# Patient Record
(demographics unavailable — no encounter records)

---

## 2024-12-06 NOTE — PHYSICAL EXAM
[Well Developed] : well developed [Normal Venous Pressure] : normal venous pressure [No Carotid Bruit] : no carotid bruit [Normal S1, S2] : normal S1, S2 [Murmur] : murmur [Soft] : abdomen soft [Non Tender] : non-tender [No Focal Deficits] : no focal deficits [de-identified] : marked right leg edema, mild-moderate left leg edema [de-identified] : moderate edema

## 2024-12-06 NOTE — ASSESSMENT
[FreeTextEntry1] : ---------------------------------------------------------------------- ECG - significant artifact. Atrial fibrillation. avg rate 58-90. low voltage  Same meds Low sodium diet Exercise RTO 3 mos

## 2025-01-02 NOTE — HISTORY OF PRESENT ILLNESS
[FreeTextEntry1] : Patient went to [facility] ER with SOB. Transferred to [facility] with CHF. Hospitalized for 8 days. Discharge records brought by patient and reviewed. Since d/c, no SOB, ERNST, chest pain.

## 2025-01-02 NOTE — PHYSICAL EXAM
[Well Developed] : well developed [No Acute Distress] : no acute distress [Normal Venous Pressure] : normal venous pressure [No Carotid Bruit] : no carotid bruit [Normal S1, S2] : normal S1, S2 [Murmur] : murmur [Clear Lung Fields] : clear lung fields [Soft] : abdomen soft [Non Tender] : non-tender [No Focal Deficits] : no focal deficits [de-identified] : mild right leg edema, trace left leg edema [de-identified] : moderate edema

## 2025-01-29 NOTE — END OF VISIT
Progress Note    Patient ID: Mary Jane Henriquez is a 60 year old female  Chief Complaint  Chief Complaint   Patient presents with   • Follow-up   • Imm/Inj     flu      History of Present Illness  Problem List Items Addressed This Visit      Respiratory   COPD (chronic obstructive pulmonary disease) (CMS/[facility])        12/21/2020  Has been doing ok and will see dr chun in February 2021  Continue inhalers          Circulatory   Elevated coronary artery calcium score      Her score was 910 and should be 0-100  Should be on atorvastatin   Recheck lipid profile now that she stopped the medication for the last few months        Essential hypertension - Primary      ==================  12/21/2020  120/60--is on verapamil 240 mg twice daily and losartan 100 mg daily at night          Endocrine   Dyslipidemia, goal LDL below 70      12/21/2020  Went off the atorvastatin that dr monterroso put her on  Need to return for fasting lipids        Relevant Orders   LIPID PANEL WITHOUT REFLEX    Other Visit Diagnoses    Needs flu shot       Relevant Orders   INFLUENZA QUADRIVALENT SPLIT PRES FREE 0.5 ML VACC, IM (FLULAVAL,FLUARIX,FLUZONE) (Completed)          History  Current Outpatient Medications   Medication Sig Dispense Refill   • LORazepam (ATIVAN) 1 MG tablet Take 1 tablet by mouth at bedtime. 30 tablet 5   • verapamil 120 MG tablet Take 2 tablets by mouth 2 times daily. 120 tablet 5   • zoledronic acid (RECLAST) 5 MG/100ML injectable solution      • losartan (COZAAR) 100 MG tablet Take 1 tablet by mouth daily. 90 tablet 3   • divalproex (DEPAKOTE) 250 MG delayed release EC tablet Take 1 tablet by mouth 3 times daily. 90 tablet 5   • meloxicam (MOBIC) 7.5 MG tablet TK 1 T PO  D  0   • montelukast (SINGULAIR) 10 MG tablet Take 10 mg by mouth every morning.     • albuterol 108 (90 Base) MCG/ACT inhaler Inhale 2 puffs into the lungs every 4 hours as needed.     • sertraline (ZOLOFT) 100 MG tablet Take 1 tablet by mouth 2 times daily.  180 tablet 3   • SUMAtriptan (IMITREX STATDOSE) 6 MG/0.5ML auto-injector INJECT 0.5 ML AT ONSET OF HEADACHE, MAY REPEAT IN 2 HOURS. MAX 2 INJECTIONS PER DAY, NO MORE THAN 2 DAYS PER WEEK 3 mL 5   • fluticasone furoate (ARNUITY ELLIPTA) 100 MCG/ACT inhaler Inhale 1 puff into the lungs daily.     • Glycopyrrolate-Formoterol (BEVESPI AEROSPHERE) 9-4.8 MCG/ACT inhaler Inhale 2 puffs into the lungs 2 times daily.     • fluticasone (FLONASE) 50 MCG/ACT nasal spray Spray in each nostril daily.     • Cholecalciferol (VITAMIN D3) 5000 units capsule Take by mouth 3 days a week.      • ALPRAZolam (XANAX) 0.25 MG tablet Take by mouth as needed.      • Multiple Vitamins-Minerals (CENTRUM SILVER) tablet Take by mouth daily.     • traMADol (ULTRAM) 50 MG tablet TK 1 T PO Q 4 TO 6 H PRN P       No current facility-administered medications for this visit.        Allergies  ALLERGIES:   Allergen Reactions   • Budeprion Sr Other (See Comments)   • Bupropion SWELLING   • Cat Dander Other (See Comments)       Immunization History   Administered Date(s) Administered   • Influenza, injectable, quadrivalent, preservative-free 12/19/2017, 12/13/2018, 01/06/2020, 12/21/2020   • Influenza, seasonal, injectable, preservative free 09/18/2017   • Influenza, seasonal, injectable, trivalent 12/20/2016   • Pneumococcal Conjugate 13 valent 03/17/2020   • Pneumococcal polysaccharide, adult, 23 valent 12/13/2018   • TD Adult, Unspecified Formulation 06/21/2009   Deferred Date(s) Deferred   • Tdap 07/28/2020       Screening schedule/checklist for next 5-10 years:  Health Maintenance Due   Topic Date Due   • DTaP/Tdap/Td Vaccine (1 - Tdap) 05/10/1979   • Shingles Vaccine (1 of 2) 05/10/2010   • Hepatitis C Screening  05/10/2011   • Influenza Vaccine (1) 09/01/2020        Family History  Family History   Problem Relation Age of Onset   • Dementia/Alzheimers Mother    • Cancer, Breast Mother    • Arthritis Mother    • Cancer Mother    • Depression Mother   [Time Spent: ___ minutes] : I have spent [unfilled] minutes of time on the encounter which excludes teaching and separately reported services.        Sister also   • Hearing Loss Mother    • Myocardial Infarction Father    • Hypertension Father    • Heart disease Father    • High blood pressure Father    • Hypertension Sister    • Melanoma Sister    • COPD Sister    • Asthma Sister    • Depression Sister    • High blood pressure Sister    • Hypertension Brother    • Myocardial Infarction Brother    • Cirrhosis Brother    • High blood pressure Brother    • Substance abuse Brother    • Patient is unaware of any medical problems Son    • Patient is unaware of any medical problems Son      Social History  Social History     Socioeconomic History   • Marital status:      Spouse name: Not on file   • Number of children: 2   • Years of education: Not on file   • Highest education level: Not on file   Occupational History   • 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    Social Needs   • Financial resource strain: Not on file   • Food insecurity     Worry: Not on file     Inability: Not on file   • Transportation needs     Medical: Not on file     Non-medical: Not on file   Tobacco Use   • Smoking status: Former Smoker     Packs/day: 1.00     Years: 40.00     Pack years: 40.00     Types: Cigarettes     Quit date: 2019     Years since quittin.4   • Smokeless tobacco: Never Used   • Tobacco comment: quit 12 weeks ago   Substance and Sexual Activity   • Alcohol use: Not Currently     Frequency: Never     Binge frequency: Never   • Drug use: Never   • Sexual activity: Not on file   Lifestyle   • Physical activity     Days per week: 0 days     Minutes per session: 0 min   • Stress: Not on file   Relationships   • Social connections     Talks on phone: Not on file     Gets together: Not on file     Attends [employer] service: Not on file     Active member of club or organization: Not on file     Attends meetings of clubs or organizations: Not on file     Relationship status: Not on file   • Intimate partner  violence     Fear of current or ex partner: Not on file     Emotionally abused: Not on file     Physically abused: Not on file     Forced sexual activity: Not on file   Other Topics Concern   • Not on file   Social History Narrative    Broken left lower leg and broken thumb in November 2019 when dog walking --had 3 bad falls--is to start physical therapy --sees orthopedic doctor --Dr Deepak Humphrey     Past Medical History   Past Medical History:   Diagnosis Date   • Agoraphobia    • Anxiety    • Closed nondisplaced fracture of proximal phalanx of left thumb with routine healing 1/6/2020    Happened on one of her falls --is under a hand specialist 's care also --Dr Dean    • Cluster headache    • COPD (chronic obstructive pulmonary disease) (CMS/[facility]) 2018   • Depression    • Emphysema of lung (CMS/[facility]) 2000   • History of kidney stones    • HTN (hypertension)    • Nicotine dependence    • Osteoporosis    • Pre-ulcerative corn or callous 3/12/2018   • RAD (reactive airway disease) 2019     Past Surgical History  Past Surgical History:   Procedure Laterality Date   • Cervical conization   w/ laser     • D and c     • Ectopic pregnancy surgery     • Endometrial ablation     • Extracorporeal shock wave lithotripsy      Dr. Dawson   • Minimally invasive microdiscectomy lumbar spine     • Nose surgery      Deviated septum   • Spine surgery  1996    L4-L5   • Thyroid biopsy       Review of Systems  Review of Systems   All other systems reviewed and are negative.    Visit Vitals  /68   Pulse 60   Temp 98 °F (36.7 °C)   Wt 94.3 kg (208 lb)   LMP  (LMP Unknown)   SpO2 95%   BMI 33.57 kg/m²     Physical Exam  Physical Exam   Constitutional: She is oriented to person, place, and time. She appears well-developed and well-nourished. No distress.   HENT:   Head: Normocephalic.   Neck: Neck supple.   Cardiovascular: Normal rate, regular rhythm and normal heart sounds.   Pulmonary/Chest: Effort normal and breath sounds  normal. No respiratory distress. She has no wheezes. She has no rales.   Neurological: She is alert and oriented to person, place, and time. No cranial nerve deficit.   Skin: Skin is warm and dry. She is not diaphoretic.   Psychiatric: She has a normal mood and affect. Her behavior is normal.     Assessment and Plan    Problem List Items Addressed This Visit        Respiratory    COPD (chronic obstructive pulmonary disease) (CMS/[facility])       12/21/2020  Has been doing ok and will see dr chun in February 2021  Continue inhalers             Circulatory    Elevated coronary artery calcium score     Her score was 910 and should be 0-100  Should be on atorvastatin   Recheck lipid profile now that she stopped the medication for the last few months          Essential hypertension - Primary     ==================  12/21/2020  120/60--is on verapamil 240 mg twice daily and losartan 100 mg daily at night             Endocrine    Dyslipidemia, goal LDL below 70     12/21/2020  Went off the atorvastatin that dr monterroso put her on  Need to return for fasting lipids          Relevant Orders    LIPID PANEL WITHOUT REFLEX      Other Visit Diagnoses     Needs flu shot        Relevant Orders    INFLUENZA QUADRIVALENT SPLIT PRES FREE 0.5 ML VACC, IM (FLULAVAL,FLUARIX,FLUZONE) (Completed)

## 2025-01-29 NOTE — PHYSICAL EXAM
[Normal Venous Pressure] : normal venous pressure [Normal S1, S2] : normal S1, S2 [Clear Lung Fields] : clear lung fields [Soft] : abdomen soft [Non Tender] : non-tender [de-identified] : comfortble [de-identified] : mid pretibial edema

## 2025-01-29 NOTE — HISTORY OF PRESENT ILLNESS
[FreeTextEntry1] : Patient here for HF followup. Breathing has improved. Working and walking without SOB. Does note dyspnea with inclines. .

## 2025-03-14 NOTE — PHYSICAL EXAM
[Well Developed] : well developed [Normal Venous Pressure] : normal venous pressure [No Carotid Bruit] : no carotid bruit [Normal S1, S2] : normal S1, S2 [Murmur] : murmur [Clear Lung Fields] : clear lung fields [Soft] : abdomen soft [Non Tender] : non-tender [de-identified] : trace pretibial edema

## 2025-03-14 NOTE — ASSESSMENT
[FreeTextEntry1] : ---------------------------------------------------------------------- Same meds RTO 2-3 mos or PRN

## 2025-03-14 NOTE — HISTORY OF PRESENT ILLNESS
[FreeTextEntry1] : Patient is breathing well. Stated "I can walk up hills without stopping." No chest pain or palpitations. Meds the same. Continuing intentional weight loss.

## 2025-06-20 NOTE — HISTORY OF PRESENT ILLNESS
[FreeTextEntry1] : Patient has substantial improvement in breathing. Walks level ground and 1 flight of stairs with no difficulty. Has reduced alcohol intake. Has lost weight. Does note leg edema at end of day,

## 2025-06-20 NOTE — PHYSICAL EXAM
[Well Developed] : well developed [Normal Venous Pressure] : normal venous pressure [No Carotid Bruit] : no carotid bruit [Normal S1, S2] : normal S1, S2 [Murmur] : murmur [Clear Lung Fields] : clear lung fields [Soft] : abdomen soft [Non Tender] : non-tender [de-identified] : trace pretibial edema